# Patient Record
Sex: FEMALE | Race: WHITE | NOT HISPANIC OR LATINO | URBAN - METROPOLITAN AREA
[De-identification: names, ages, dates, MRNs, and addresses within clinical notes are randomized per-mention and may not be internally consistent; named-entity substitution may affect disease eponyms.]

---

## 2021-11-01 ENCOUNTER — NEW PATIENT ROUTINE/VISION (OUTPATIENT)
Dept: URBAN - METROPOLITAN AREA CLINIC 50 | Facility: CLINIC | Age: 66
End: 2021-11-01

## 2021-11-01 DIAGNOSIS — H43.813: ICD-10-CM

## 2021-11-01 DIAGNOSIS — H10.33: ICD-10-CM

## 2021-11-01 DIAGNOSIS — Z01.01: ICD-10-CM

## 2021-11-01 DIAGNOSIS — H25.813: ICD-10-CM

## 2021-11-01 PROCEDURE — 92004 COMPRE OPH EXAM NEW PT 1/>: CPT

## 2021-11-01 PROCEDURE — 92015 DETERMINE REFRACTIVE STATE: CPT

## 2021-11-01 ASSESSMENT — KERATOMETRY
OD_AXISANGLE2_DEGREES: 40
OS_AXISANGLE2_DEGREES: 90
OD_AXISANGLE_DEGREES: 130
OD_K1POWER_DIOPTERS: 45.25
OS_AXISANGLE_DEGREES: 180
OD_K2POWER_DIOPTERS: 46.50
OS_K1POWER_DIOPTERS: 46.25
OS_K2POWER_DIOPTERS: 46.25

## 2021-11-01 ASSESSMENT — TONOMETRY
OS_IOP_MMHG: 20
OD_IOP_MMHG: 18

## 2021-11-01 ASSESSMENT — VISUAL ACUITY
OU_CC: J1@16IN
OS_GLARE: 20/70
OU_SC: 20/200+1
OD_GLARE: 20/80
OD_SC: 20/200
OS_PH: 20/25-2
OD_CC: 20/30-1
OS_SC: 20/200
OS_CC: 20/40+1
OS_GLARE: 20/50
OD_GLARE: 20/50
OU_CC: 20/25-1

## 2021-11-01 NOTE — PATIENT DISCUSSION
VISUALLY SIGNIFICANT: Informed patient that their cataract is visually significant and that surgery is recommended to improve patient's visual acuity and/or glare symptoms. RBAs of surgery discussed and questions answered. Patient to schedule biometry measurements and surgery. Will hold glasses Rx. for cataract surgery.

## 2021-11-01 NOTE — PATIENT DISCUSSION
Patient understands cataract surgery will not get rid of floaters and that in fact floaters may be more noticeable once haziness is removed.

## 2021-11-01 NOTE — PATIENT DISCUSSION
Good ocular health on dilated exam today despite abnormal findings. Patient instructed to call office immediately if sudden changes in vision occur. Emphasized importance of sunglasses and healthy lifestyle.

## 2021-12-02 ENCOUNTER — BIOMETRY (OUTPATIENT)
Dept: URBAN - METROPOLITAN AREA CLINIC 50 | Facility: CLINIC | Age: 66
End: 2021-12-02

## 2021-12-02 DIAGNOSIS — H25.813: ICD-10-CM

## 2021-12-02 PROCEDURE — TOPOIOL CORNEAL TOPOGRAPHY-PREMIUM IOL

## 2021-12-02 PROCEDURE — 92136 OPHTHALMIC BIOMETRY: CPT

## 2021-12-02 ASSESSMENT — KERATOMETRY
OS_AXISANGLE2_DEGREES: 73
OS_K2POWER_DIOPTERS: 45.00
OD_AXISANGLE_DEGREES: 025
OD_K1POWER_DIOPTERS: 46.00
OS_AXISANGLE_DEGREES: 163
OS_K1POWER_DIOPTERS: 45.87
OD_AXISANGLE2_DEGREES: 115
OD_K2POWER_DIOPTERS: 44.12

## 2022-03-07 ENCOUNTER — PRE-OP/H&P (OUTPATIENT)
Dept: URBAN - METROPOLITAN AREA CLINIC 50 | Facility: CLINIC | Age: 67
End: 2022-03-07

## 2022-03-07 DIAGNOSIS — H43.813: ICD-10-CM

## 2022-03-07 DIAGNOSIS — H25.812: ICD-10-CM

## 2022-03-07 PROCEDURE — PREOP PRE OP VISIT

## 2022-03-07 PROCEDURE — 92134 CPTRZ OPH DX IMG PST SGM RTA: CPT

## 2022-03-07 ASSESSMENT — KERATOMETRY
OD_K1POWER_DIOPTERS: 46.00
OS_AXISANGLE2_DEGREES: 73
OD_AXISANGLE_DEGREES: 025
OS_AXISANGLE_DEGREES: 163
OS_K2POWER_DIOPTERS: 45.00
OD_K2POWER_DIOPTERS: 44.12
OD_AXISANGLE2_DEGREES: 115
OS_K1POWER_DIOPTERS: 45.87

## 2022-03-07 ASSESSMENT — VISUAL ACUITY
OS_CC: 20/80
OD_CC: 20/40
OU_CC: 20/40

## 2022-03-07 ASSESSMENT — TONOMETRY
OD_IOP_MMHG: 15
OS_IOP_MMHG: 16

## 2022-03-23 ASSESSMENT — KERATOMETRY
OD_K1POWER_DIOPTERS: 46.00
OD_K2POWER_DIOPTERS: 44.12
OD_AXISANGLE_DEGREES: 025
OD_AXISANGLE2_DEGREES: 115
OS_K1POWER_DIOPTERS: 45.87
OS_AXISANGLE_DEGREES: 163
OS_AXISANGLE2_DEGREES: 73
OS_K2POWER_DIOPTERS: 45.00

## 2022-03-24 ENCOUNTER — SURGERY/PROCEDURE (OUTPATIENT)
Dept: URBAN - METROPOLITAN AREA SURGERY 16 | Facility: SURGERY | Age: 67
End: 2022-03-24

## 2022-03-24 ENCOUNTER — POST-OP (OUTPATIENT)
Dept: URBAN - METROPOLITAN AREA CLINIC 48 | Facility: CLINIC | Age: 67
End: 2022-03-24

## 2022-03-24 DIAGNOSIS — H25.812: ICD-10-CM

## 2022-03-24 DIAGNOSIS — Z98.42: ICD-10-CM

## 2022-03-24 DIAGNOSIS — Z96.1: ICD-10-CM

## 2022-03-24 PROCEDURE — 99024 POSTOP FOLLOW-UP VISIT: CPT

## 2022-03-24 PROCEDURE — 66984 XCAPSL CTRC RMVL W/O ECP: CPT

## 2022-03-24 ASSESSMENT — VISUAL ACUITY: OS_SC: 20/30

## 2022-03-24 ASSESSMENT — KERATOMETRY
OD_AXISANGLE2_DEGREES: 115
OS_AXISANGLE_DEGREES: 163
OS_K1POWER_DIOPTERS: 45.87
OD_K1POWER_DIOPTERS: 46.00
OS_AXISANGLE2_DEGREES: 73
OD_K2POWER_DIOPTERS: 44.12
OD_AXISANGLE_DEGREES: 025
OS_K2POWER_DIOPTERS: 45.00

## 2022-03-24 ASSESSMENT — TONOMETRY: OS_IOP_MMHG: 17

## 2022-03-28 ENCOUNTER — PRE-OP/H&P (OUTPATIENT)
Dept: URBAN - METROPOLITAN AREA CLINIC 50 | Facility: CLINIC | Age: 67
End: 2022-03-28

## 2022-03-28 DIAGNOSIS — H25.811: ICD-10-CM

## 2022-03-28 DIAGNOSIS — Z98.41: ICD-10-CM

## 2022-03-28 PROCEDURE — PREOP PRE OP VISIT

## 2022-03-28 PROCEDURE — 92136 - 2N OPHTHALMIC BIOMETRY BY PARTIAL COHERENCE INTERFEROMETRY WITH INTRAOCULAR LENS POWER CALCULATION

## 2022-03-28 ASSESSMENT — KERATOMETRY
OS_AXISANGLE2_DEGREES: 73
OD_AXISANGLE2_DEGREES: 115
OS_AXISANGLE_DEGREES: 163
OD_K1POWER_DIOPTERS: 46.00
OD_K2POWER_DIOPTERS: 44.12
OD_AXISANGLE_DEGREES: 025
OS_K2POWER_DIOPTERS: 45.00
OS_K1POWER_DIOPTERS: 45.87

## 2022-03-28 ASSESSMENT — TONOMETRY
OD_IOP_MMHG: 15
OS_IOP_MMHG: 14

## 2022-03-28 ASSESSMENT — VISUAL ACUITY
OD_CC: 20/40
OS_SC: 20/25
OS_SC: 20/30
OS_SC: J5

## 2022-03-28 NOTE — PATIENT DISCUSSION
CATARACT SURGERY PLANNER - TORIC IOL/+FEMTO: Phacoemulsification with IOL: Eye: OD|DOS: 4/7/2022|Model: [id number]|Power: 18. 0|Target: PLANO|Femto: YES|Axis: 28°|Visc: DUET|Omidria: YES|10% Phenylephrine: YES|Epi-shugarcaine: YES|Phaco Setting: STD|Notes: Plan: Eyhn=ance Toric Target PLANO OU. Hx: No Macular pathology. DILATES to 7MM.

## 2022-04-06 ASSESSMENT — KERATOMETRY
OS_K1POWER_DIOPTERS: 45.87
OD_AXISANGLE2_DEGREES: 115
OS_AXISANGLE_DEGREES: 163
OD_K2POWER_DIOPTERS: 44.12
OS_K2POWER_DIOPTERS: 45.00
OD_K1POWER_DIOPTERS: 46.00
OS_AXISANGLE2_DEGREES: 73
OD_AXISANGLE_DEGREES: 025

## 2022-04-07 ENCOUNTER — POST-OP (OUTPATIENT)
Dept: URBAN - METROPOLITAN AREA SURGERY 16 | Facility: SURGERY | Age: 67
End: 2022-04-07

## 2022-04-07 ENCOUNTER — SURGERY/PROCEDURE (OUTPATIENT)
Dept: URBAN - METROPOLITAN AREA SURGERY 16 | Facility: SURGERY | Age: 67
End: 2022-04-07

## 2022-04-07 DIAGNOSIS — Z98.41: ICD-10-CM

## 2022-04-07 DIAGNOSIS — Z98.42: ICD-10-CM

## 2022-04-07 DIAGNOSIS — H25.811: ICD-10-CM

## 2022-04-07 PROCEDURE — 99199PSUP SUPERIOR VISION PACKAGE

## 2022-04-07 PROCEDURE — 66984 XCAPSL CTRC RMVL W/O ECP: CPT

## 2022-04-07 ASSESSMENT — VISUAL ACUITY: OD_SC: 20/25-2

## 2022-04-07 ASSESSMENT — TONOMETRY: OD_IOP_MMHG: 13

## 2022-04-11 ENCOUNTER — POST-OP (OUTPATIENT)
Dept: URBAN - METROPOLITAN AREA CLINIC 50 | Facility: CLINIC | Age: 67
End: 2022-04-11

## 2022-04-11 DIAGNOSIS — Z98.41: ICD-10-CM

## 2022-04-11 PROCEDURE — 99024 POSTOP FOLLOW-UP VISIT: CPT

## 2022-04-11 ASSESSMENT — VISUAL ACUITY
OU_SC: 20/20-2
OS_SC: 20/25
OD_SC: 20/25-2

## 2022-04-11 ASSESSMENT — TONOMETRY
OD_IOP_MMHG: 12
OS_IOP_MMHG: 14

## 2022-05-02 ENCOUNTER — POST-OP (OUTPATIENT)
Dept: URBAN - METROPOLITAN AREA CLINIC 50 | Facility: CLINIC | Age: 67
End: 2022-05-02

## 2022-05-02 DIAGNOSIS — Z98.42: ICD-10-CM

## 2022-05-02 DIAGNOSIS — Z98.41: ICD-10-CM

## 2022-05-02 PROCEDURE — 92134 CPTRZ OPH DX IMG PST SGM RTA: CPT

## 2022-05-02 PROCEDURE — 99024 POSTOP FOLLOW-UP VISIT: CPT

## 2022-05-02 PROCEDURE — 92015 DETERMINE REFRACTIVE STATE: CPT

## 2022-05-02 ASSESSMENT — TONOMETRY
OS_IOP_MMHG: 14
OD_IOP_MMHG: 15

## 2022-05-02 ASSESSMENT — VISUAL ACUITY
OD_SC: 20/40-1
OU_SC: 20/25-1
OU_SC: J3
OU_SC: J3
OD_PH: 20/30-1+2
OS_SC: 20/25-1

## 2022-06-16 ENCOUNTER — CONTACT LENSES/GLASSES VISIT (OUTPATIENT)
Dept: URBAN - METROPOLITAN AREA CLINIC 53 | Facility: CLINIC | Age: 67
End: 2022-06-16

## 2022-06-16 DIAGNOSIS — H52.4: ICD-10-CM

## 2022-06-16 PROCEDURE — 92015GRNC REFRACTION GLASSES RECHECK NO CHARGE

## 2022-06-16 ASSESSMENT — VISUAL ACUITY
OU_SC: J7
OU_SC: 20/25-2
OD_SC: 20/25-2
OS_SC: 20/25-1

## 2023-11-30 ENCOUNTER — COMPREHENSIVE EXAM (OUTPATIENT)
Dept: URBAN - METROPOLITAN AREA CLINIC 50 | Facility: LOCATION | Age: 68
End: 2023-11-30

## 2023-11-30 DIAGNOSIS — H52.4: ICD-10-CM

## 2023-11-30 DIAGNOSIS — Z01.01: ICD-10-CM

## 2023-11-30 PROCEDURE — 92015 DETERMINE REFRACTIVE STATE: CPT

## 2023-11-30 PROCEDURE — 92014 COMPRE OPH EXAM EST PT 1/>: CPT

## 2023-11-30 ASSESSMENT — VISUAL ACUITY
OU_CC: 20/25+2
OD_GLARE: 20/25
OS_GLARE: 20/25
OS_GLARE: 20/30
OS_PH: 20/25
OS_CC: 20/30+1
OD_GLARE: 20/30
OU_CC: J1@16"
OD_CC: 20/25-2

## 2023-11-30 ASSESSMENT — KERATOMETRY
OD_K1POWER_DIOPTERS: 44.50
OD_AXISANGLE_DEGREES: 122
OS_K1POWER_DIOPTERS: 45.75
OS_AXISANGLE_DEGREES: 070
OS_K2POWER_DIOPTERS: 46.25
OS_AXISANGLE2_DEGREES: 160
OD_K2POWER_DIOPTERS: 46.25
OD_AXISANGLE2_DEGREES: 32

## 2023-11-30 ASSESSMENT — TONOMETRY
OD_IOP_MMHG: 16
OS_IOP_MMHG: 16

## 2025-04-24 ENCOUNTER — COMPREHENSIVE EXAM (OUTPATIENT)
Age: 70
End: 2025-04-24

## 2025-04-24 DIAGNOSIS — H52.4: ICD-10-CM

## 2025-04-24 DIAGNOSIS — Z01.01: ICD-10-CM

## 2025-04-24 PROCEDURE — 92015 DETERMINE REFRACTIVE STATE: CPT

## 2025-04-24 PROCEDURE — 92014 COMPRE OPH EXAM EST PT 1/>: CPT

## 2025-05-05 ENCOUNTER — CONSULTATION/EVALUATION (OUTPATIENT)
Age: 70
End: 2025-05-05

## 2025-05-05 DIAGNOSIS — H43.813: ICD-10-CM

## 2025-05-05 DIAGNOSIS — H26.493: ICD-10-CM

## 2025-05-05 DIAGNOSIS — H35.371: ICD-10-CM

## 2025-05-05 PROBLEM — Z98.890: Noted: 2025-05-05

## 2025-05-05 PROCEDURE — 99214 OFFICE O/P EST MOD 30 MIN: CPT | Mod: 57

## 2025-05-05 PROCEDURE — 66821 AFTER CATARACT LASER SURGERY: CPT

## 2025-05-05 RX ORDER — PREDNISOLONE ACETATE 10 MG/ML: 1 SUSPENSION/ DROPS OPHTHALMIC TWICE A DAY

## 2025-05-12 ENCOUNTER — CLINIC PROCEDURE ONLY (OUTPATIENT)
Age: 70
End: 2025-05-12

## 2025-05-12 DIAGNOSIS — H26.491: ICD-10-CM

## 2025-05-12 PROCEDURE — 66821 AFTER CATARACT LASER SURGERY: CPT | Mod: 79,RT

## 2025-06-11 ENCOUNTER — POST-OP (OUTPATIENT)
Age: 70
End: 2025-06-11

## 2025-06-11 DIAGNOSIS — Z98.890: ICD-10-CM

## 2025-06-11 DIAGNOSIS — H52.4: ICD-10-CM

## 2025-06-11 PROCEDURE — 92015 DETERMINE REFRACTIVE STATE: CPT

## 2025-06-11 PROCEDURE — 99024 POSTOP FOLLOW-UP VISIT: CPT

## 2025-07-23 ENCOUNTER — FOLLOW UP (OUTPATIENT)
Age: 70
End: 2025-07-23

## 2025-07-23 DIAGNOSIS — H47.291: ICD-10-CM

## 2025-07-23 PROCEDURE — 92133 CPTRZD OPH DX IMG PST SGM ON: CPT

## 2025-07-23 PROCEDURE — 99213 OFFICE O/P EST LOW 20 MIN: CPT

## 2025-07-23 PROCEDURE — 92083 EXTENDED VISUAL FIELD XM: CPT
